# Patient Record
Sex: MALE | Race: WHITE | NOT HISPANIC OR LATINO | Employment: FULL TIME | URBAN - METROPOLITAN AREA
[De-identification: names, ages, dates, MRNs, and addresses within clinical notes are randomized per-mention and may not be internally consistent; named-entity substitution may affect disease eponyms.]

---

## 2017-01-09 ENCOUNTER — THERAPY VISIT (OUTPATIENT)
Dept: SLEEP MEDICINE | Facility: CLINIC | Age: 42
End: 2017-01-09
Attending: INTERNAL MEDICINE
Payer: COMMERCIAL

## 2017-01-09 DIAGNOSIS — G47.33 OBSTRUCTIVE SLEEP APNEA: ICD-10-CM

## 2017-01-09 PROCEDURE — 95811 POLYSOM 6/>YRS CPAP 4/> PARM: CPT | Mod: ZF

## 2017-01-10 PROBLEM — G47.33 SEVERE OBSTRUCTIVE SLEEP APNEA: Status: ACTIVE | Noted: 2017-01-10

## 2017-01-10 NOTE — PROCEDURES
"SLEEP STUDY INTERPRETATION  SPLIT-NIGHT STUDY      Patient: Rikki Mejia  Date of Birth: [birth year]  Study Date: 1/09/17  MRN: [id number]  Referring Provider: self  Ordering Provider: MD Chelle, Jose    Indications for Polysomnography: The patient is a 41 y old Male who is 6' 3\" and weighs 273.0 lbs.  His BMI is 34.3, [place] sleepiness scale is 0.0 and neck size is 43cm.  Relevant medical history includes borderline hypertension.  A diagnostic polysomnogram was performed to evaluate for sleep apnea.  After 132.5 minutes of sleep time the patient exhibited sufficient respiratory events qualifying him for a CPAP trial which was then initiated.      Polysomnogram Data:  A full night polysomnogram recorded the standard physiologic parameters including EEG, EOG, EMG, ECG, nasal and oral airflow.  Respiratory parameters of chest and abdominal movements were recorded with respiratory inductance plethysmography.  Oxygen saturation was recorded by pulse oximetry.      Diagnostic PSG  Sleep Architecture: Severe sleep disruption attributable to respiratory events with absence of stage R.  The total recording time of the diagnostic portion of the study was 147.5 minutes.  The total sleep time was 132.5 minutes.  During the diagnostic portion of the study the sleep latency was decreased at 4.0 minutes without the use of a sleep aid.  REM latency was - minutes.  Arousal index was increased at 60.7 arousals per hour.  Sleep efficiency was normal at 89.8%.  Wake after sleep onset was 9.5 minutes.   The patient spent 6.8% of total sleep time in Stage N1, 87.2% in Stage N2, 6.0% in Stage N3 and 0.0% in REM.       Respiration: Severe obstructive sleep apnea worse supine.     Events - During the diagnostic portion of the study, the polysomnogram revealed a presence of 19 obstructive apneas resulting in an apnea index of 8.6 events per hour.  There were 61 hypopneas resulting in a hypopnea index of 27.6 events per hour.  The combined " apnea/hypopnea index was 36.2 events per hour.  The supine AHI was 67.3 events per hour.  The RERA index was 23.5 events per hour.  The RDI was 59.7 events per hour.     Snoring - was reported as loud    Respiratory rate and pattern - was notable for normal respiratory rate and pattern.    Sustained Sleep Associated Hypoventilation - Transcutaneous carbon dioxide monitoring was not used.    Sleep Associated Hypoxemia - (Greater than 5 minutes O2 sat below 89%) was not present.  Baseline oxygen saturation was 94.4%. Lowest oxygen saturation was 86.5%.  Time spent less than or equal to 88% was 0.2 minutes.  Time spent less than or equal to 89% was 0.4 minutes.  59.7 23.5 36.2     Treatment PSG  Sleep Architecture: Summary assessment  At 02:16:12 AM the patient was placed on CPAP treatment and was titrated at pressures ranging from 6 cmH2O up to 9 cmH2O.  The total recording time of the treatment portion of the study was 243.3 minutes.  The total sleep time was 216.5 minutes.  During the treatment portion of the study the sleep latency was 7.0 minutes.  REM latency was 21.5 minutes.  Arousal index was normal at 15.0 arousals per hour.  Sleep efficiency was normal at 89.0%.  Wake after sleep onset was 15.0 minutes.  The patient spent 5.3% of total sleep time in Stage N1, 35.3% in Stage N2, 23.6% in Stage N3 and 35.8% in REM.       Respiration: Elimination of significant sleep apnea with CPAP.  The optimal pressure was 9 cmH2O with an AHI of 0 events per hour.  Time in REM supine on final pressure was 37.5 minutes.   This titration was considered optimal (residual AHI < 5 events per hour and including REM-supine sleep at final pressure).     Movement Activity: No abnormal sleep movements.     Periodic Limb Movements  o During the diagnostic portion of the study, there were 7 PLMs recorded. The PLM index was 3.2 movements per hour.  The PLM Arousal Index was 0 per hour.  o During the treatment portion of the study, there  were 4 PLMs recorded. The PLM index was 1.1 movements per hour.  The PLM Arousal Index was 0 per hour.    REM EMG Activity - Excessive transient / sustained muscle activity was not present.    Nocturnal Behavior - Abnormal sleep related behaviors were not noted.    Bruxism - None apparent.    Cardiac Summary: Normal sinus rhythm.   During the diagnostic portion of the study, the average pulse rate was 68.8 bpm.  The minimum pulse rate was 54.2 bpm while the maximum pulse rate was 109.8 bpm.    During the treatment portion of the study, the average pulse rate was 64.0 bpm.  The minimum pulse rate was 51.9 bpm while the maximum pulse rate was 101.1 bpm.   Arrhythmias were not noted.      Assessment:     Severe obstructive sleep apnea and sleep disruption worse supine and effectively treated with CPAP.    Recommendations:    Treatment of ZACHARY with Auto-titrating PAP therapy with a range of 7 -  17 cmH2O.  Recommend clinical follow up with sleep management team, including review of compliance measures.    If CPAP unacceptable, consider oral device, or surgery. Long term weight loss and sleep positioning may improve response to alternative therapies.     Advise regarding the risks of drowsy driving.    Suggest optimizing sleep schedule and avoiding sleep deprivation.    Weight management.    Pharmacologic therapy should be used for management of restless legs syndrome only if present and clinically indicated and not based on the presence of periodic limb movements alone.    Diagnostic Codes:    Obstructive Sleep Apnea G47.33    Repetitive Intrusions Into Sleep F51.8                                                                                      _____________________________________   Jose Corbett MD 1/10/17

## 2017-01-10 NOTE — PATIENT INSTRUCTIONS
[facility]    1. Your sleep study will be reviewed by a sleep physician within the next few days.     2. Please follow up in the sleep clinic as scheduled, or, make an appointment with your sleep provider to be seen within two weeks to discuss the results of the sleep study.    3. If you have any questions or problems with your treatment plan, please contact your sleep clinic provider at [phone] to further manage your condition.    4. Please review your attached medication list, and, at your follow-up appointment advise your sleep clinic provider about any changes.    5. Go to [email or web address] for more information about your sleep problems.    Rodo King, RPSGT  January 10, 2017

## 2017-01-10 NOTE — PROGRESS NOTES
Completed a split night PSG per provider order.    Preliminary AHI 35.  A final therapeutic PAP pressure was achieved.    Supine REM was seen on therapeutic pressure.    Patient reports feeling refreshed in AM.

## 2017-02-02 ENCOUNTER — OFFICE VISIT (OUTPATIENT)
Dept: SLEEP MEDICINE | Facility: CLINIC | Age: 42
End: 2017-02-02
Attending: INTERNAL MEDICINE
Payer: COMMERCIAL

## 2017-02-02 VITALS
OXYGEN SATURATION: 96 % | BODY MASS INDEX: 35.41 KG/M2 | HEART RATE: 72 BPM | SYSTOLIC BLOOD PRESSURE: 133 MMHG | RESPIRATION RATE: 16 BRPM | WEIGHT: 275.9 LBS | DIASTOLIC BLOOD PRESSURE: 88 MMHG | HEIGHT: 74 IN

## 2017-02-02 DIAGNOSIS — G47.33 OBSTRUCTIVE SLEEP APNEA: Primary | ICD-10-CM

## 2017-02-02 PROCEDURE — 99211 OFF/OP EST MAY X REQ PHY/QHP: CPT | Mod: ZF

## 2017-02-02 PROCEDURE — 40000809 ZZH STATISTIC NO DOCUMENTATION TO SUPPORT CHARGE

## 2017-02-02 NOTE — NURSING NOTE
"Chief Complaint   Patient presents with     RECHECK     follow up psg       Initial /90 mmHg  Pulse 79  Resp 16  Ht 1.88 m (6' 2\")  Wt 125.147 kg (275 lb 14.4 oz)  BMI 35.41 kg/m2  SpO2 96% Estimated body mass index is 35.41 kg/(m^2) as calculated from the following:    Height as of this encounter: 1.88 m (6' 2\").    Weight as of this encounter: 125.147 kg (275 lb 14.4 oz).  BP completed using cuff size: large  Left arm    Michaelan-Gogo CMA      "

## 2017-02-02 NOTE — PATIENT INSTRUCTIONS

## 2017-02-02 NOTE — PROGRESS NOTES
Balaji Patton is a 41-year-old gentleman with severe obesity and severe obstructive sleep apnea with hypoxemia based on polysomnography performed in 2016 showing an apnea/hypopnea index of 36 with rather severe sleep disruption resolved with the use of CPAP.  He does not have daytime sleepiness would be interested in reducing health risks by treating his sleep apnea.  We have offered him an oral device therapy as primary therapy.  He does have a positional component; however, there is some residual disease in the left decubitus position while sleeping.  Oral device might be more effective in this setting.      PLAN:  Oral device therapy for sleep apnea.  Follow up in 3 months and consideration for repeat home sleep testing with this device once adjusted      Total time spent with patient 25 minutes, greater than 50% counseling regarding management of sleep apnea continuity of care.         RICHELLE JOHNSON MD             D: 2017 16:01   T: 2017 17:08   MT: DIVYA      Name:     BALAJI PATTON   MRN:      [id number]        Account:      [id number]   :      1975           Visit Date:   2017      Document: [id number]

## 2017-02-03 ENCOUNTER — DOCUMENTATION ONLY (OUTPATIENT)
Dept: SLEEP MEDICINE | Facility: CLINIC | Age: 42
End: 2017-02-03

## 2017-02-03 NOTE — PROGRESS NOTES
Dental referral sent on Friday February 3, 2017  to [facility] ([address]) to have Dental  to review and start process of referral then contact pt with scheduling.  Vaishali Garcia,

## 2017-02-22 ENCOUNTER — TELEPHONE (OUTPATIENT)
Dept: DENTISTRY | Facility: CLINIC | Age: 42
End: 2017-02-22

## 2018-01-17 NOTE — TELEPHONE ENCOUNTER
Called to schedule NP appt to see Dr. Cramer for ZACHARY. Left message with my contact phone number and ext.

## 2018-10-12 ENCOUNTER — ALLIED HEALTH/NURSE VISIT (OUTPATIENT)
Dept: NURSING | Facility: CLINIC | Age: 43
End: 2018-10-12
Payer: COMMERCIAL

## 2018-10-12 DIAGNOSIS — Z23 NEED FOR PROPHYLACTIC VACCINATION AND INOCULATION AGAINST INFLUENZA: Primary | ICD-10-CM

## 2018-10-12 PROCEDURE — 90471 IMMUNIZATION ADMIN: CPT

## 2018-10-12 PROCEDURE — 90686 IIV4 VACC NO PRSV 0.5 ML IM: CPT

## 2018-10-12 PROCEDURE — 99207 ZZC NO CHARGE NURSE ONLY: CPT

## 2018-10-12 NOTE — MR AVS SNAPSHOT
After Visit Summary   10/12/2018    Rikki Mejia    MRN: [id number]           Patient Information     Date Of Birth          1975        Visit Information        Provider Department      10/12/2018 9:40 AM CARE COORDINATOR [facility]        Today's Diagnoses     Need for prophylactic vaccination and inoculation against influenza    -  1       Follow-ups after your visit        Who to contact     If you have questions or need follow up information about today's clinic visit or your schedule please contact [facility] directly at [phone].  Normal or non-critical lab and imaging results will be communicated to you by [employer], letter or phone within 4 business days after the clinic has received the results. If you do not hear from us within 7 days, please contact the clinic through [employer] or phone. If you have a critical or abnormal lab result, we will notify you by phone as soon as possible.  Submit refill requests through [employer] or call your pharmacy and they will forward the refill request to us. Please allow 3 business days for your refill to be completed.          Additional Information About Your Visit        MyChart Information     [employer] gives you secure access to your electronic health record. If you see a primary care provider, you can also send messages to your care team and make appointments. If you have questions, please call your primary care clinic.  If you do not have a primary care provider, please call [phone] and they will assist you.        Care EveryWhere ID     This is your Care EveryWhere ID. This could be used by other organizations to access your [facility] medical records  [id number]         Blood Pressure from Last 3 Encounters:   02/02/17 141/90   12/15/16 141/88   11/08/16 120/80    Weight from Last 3 Encounters:   02/02/17 275 lb 14.4 oz (125.1 kg)   12/15/16 273 lb (123.8 kg)   11/08/16 272 lb  (123.4 kg)              We Performed the Following     FLU VACCINE, SPLIT VIRUS, IM (QUADRIVALENT) [66133]- >3 YRS     Vaccine Administration, Initial [51997]        Primary Care Provider    None Specified       No primary provider on file.        Equal Access to Services     JAMIE SILVA : Hadii aad ku hadellanalini Nikiafranky, wajaxonda luqadaha, qaybta kaalmada adenatalieda, carol reece blakeluca lesliekristalcaitlyn marques. So [facility] [phone].    ATENCIÓN: Si habla español, tiene a alejandro disposición servicios gratuitos de asistencia lingüística. Llame al [phone].    We comply with applicable federal civil rights laws and [place] laws. We do not discriminate on the basis of race, color, national origin, age, disability, sex, sexual orientation, or gender identity.            Thank you!     Thank you for choosing [facility]  for your care. Our goal is always to provide you with excellent care. Hearing back from our patients is one way we can continue to improve our services. Please take a few minutes to complete the written survey that you may receive in the mail after your visit with us. Thank you!             Your Updated Medication List - Protect others around you: Learn how to safely use, store and throw away your medicines at www.disposemymeds.org.      Notice  As of 10/12/2018 11:00 AM    You have not been prescribed any medications.

## 2018-10-12 NOTE — PROGRESS NOTES
Injectable Influenza Immunization Documentation    1.  Is the person to be vaccinated sick today?   No    2. Does the person to be vaccinated have an allergy to a component   of the vaccine?   No  Egg Allergy Algorithm Link    3. Has the person to be vaccinated ever had a serious reaction   to influenza vaccine in the past?   No    4. Has the person to be vaccinated ever had Guillain-Barré syndrome?   No    Form completed by Jean Nur CMA ([facility])

## 2020-03-01 ENCOUNTER — HEALTH MAINTENANCE LETTER (OUTPATIENT)
Age: 45
End: 2020-03-01

## 2020-07-17 ENCOUNTER — HOSPITAL ENCOUNTER (EMERGENCY)
Facility: CLINIC | Age: 45
Discharge: HOME | End: 2020-07-17
Attending: INTERNAL MEDICINE | Admitting: INTERNAL MEDICINE
Payer: COMMERCIAL

## 2020-07-17 ENCOUNTER — APPOINTMENT (OUTPATIENT)
Dept: GENERAL RADIOLOGY | Facility: CLINIC | Age: 45
End: 2020-07-17
Attending: EMERGENCY MEDICINE
Payer: COMMERCIAL

## 2020-07-17 ENCOUNTER — APPOINTMENT (OUTPATIENT)
Dept: ULTRASOUND IMAGING | Facility: CLINIC | Age: 45
End: 2020-07-17
Attending: EMERGENCY MEDICINE
Payer: COMMERCIAL

## 2020-07-17 VITALS
BODY MASS INDEX: 33.37 KG/M2 | DIASTOLIC BLOOD PRESSURE: 77 MMHG | WEIGHT: 260 LBS | RESPIRATION RATE: 16 BRPM | HEART RATE: 80 BPM | TEMPERATURE: 98.5 F | OXYGEN SATURATION: 97 % | SYSTOLIC BLOOD PRESSURE: 127 MMHG | HEIGHT: 74 IN

## 2020-07-17 DIAGNOSIS — M79.662 PAIN OF LEFT CALF: ICD-10-CM

## 2020-07-17 PROCEDURE — 99284 EMERGENCY DEPT VISIT MOD MDM: CPT | Mod: Z6 | Performed by: EMERGENCY MEDICINE

## 2020-07-17 PROCEDURE — 76882 US LMTD JT/FCL EVL NVASC XTR: CPT

## 2020-07-17 PROCEDURE — 73560 X-RAY EXAM OF KNEE 1 OR 2: CPT | Mod: LT

## 2020-07-17 PROCEDURE — 99285 EMERGENCY DEPT VISIT HI MDM: CPT | Mod: 25

## 2020-07-17 PROCEDURE — 25000132 ZZH RX MED GY IP 250 OP 250 PS 637: Performed by: EMERGENCY MEDICINE

## 2020-07-17 PROCEDURE — 73590 X-RAY EXAM OF LOWER LEG: CPT | Mod: LT

## 2020-07-17 PROCEDURE — 73630 X-RAY EXAM OF FOOT: CPT | Mod: LT

## 2020-07-17 RX ORDER — HYDROCODONE BITARTRATE AND ACETAMINOPHEN 5; 325 MG/1; MG/1
1 TABLET ORAL ONCE
Status: COMPLETED | OUTPATIENT
Start: 2020-07-17 | End: 2020-07-17

## 2020-07-17 RX ORDER — NAPROXEN 500 MG/1
500 TABLET ORAL ONCE
Status: COMPLETED | OUTPATIENT
Start: 2020-07-17 | End: 2020-07-17

## 2020-07-17 RX ADMIN — NAPROXEN 500 MG: 500 TABLET ORAL at 20:05

## 2020-07-17 RX ADMIN — HYDROCODONE BITARTRATE AND ACETAMINOPHEN 1 TABLET: 5; 325 TABLET ORAL at 20:05

## 2020-07-17 ASSESSMENT — MIFFLIN-ST. JEOR: SCORE: 2134.1
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
## 2020-07-18 NOTE — ED TRIAGE NOTES
Patient presents A&Ox4 to triage c/o left lower leg pain. Patient states he was playing around with his kids today when he got a sharp pain in his left lower leg. Patient also states he is not able to move his left foot without pain that radiates to the back of his left knee.

## 2020-07-18 NOTE — DISCHARGE INSTRUCTIONS
Please make an appointment to follow up with Your Primary Care Provider and Orthopedics Clinic (phone: [phone]) in 7 days as needed.

## 2022-03-22 ENCOUNTER — HOSPITAL ENCOUNTER (EMERGENCY)
Facility: CLINIC | Age: 47
Discharge: HOME | End: 2022-03-22
Attending: EMERGENCY MEDICINE | Admitting: EMERGENCY MEDICINE
Payer: COMMERCIAL

## 2022-03-22 VITALS
HEIGHT: 74 IN | BODY MASS INDEX: 33.37 KG/M2 | RESPIRATION RATE: 16 BRPM | TEMPERATURE: 97.6 F | OXYGEN SATURATION: 98 % | DIASTOLIC BLOOD PRESSURE: 92 MMHG | SYSTOLIC BLOOD PRESSURE: 133 MMHG | WEIGHT: 260 LBS | HEART RATE: 84 BPM

## 2022-03-22 DIAGNOSIS — M54.50 LUMBAR BACK PAIN: ICD-10-CM

## 2022-03-22 PROCEDURE — 250N000013 HC RX MED GY IP 250 OP 250 PS 637: Performed by: EMERGENCY MEDICINE

## 2022-03-22 PROCEDURE — 99284 EMERGENCY DEPT VISIT MOD MDM: CPT | Performed by: EMERGENCY MEDICINE

## 2022-03-22 PROCEDURE — 99284 EMERGENCY DEPT VISIT MOD MDM: CPT

## 2022-03-22 RX ORDER — CYCLOBENZAPRINE HCL 10 MG
5 TABLET ORAL 3 TIMES DAILY PRN
Qty: 9 TABLET | Refills: 0 | Status: SHIPPED | OUTPATIENT
Start: 2022-03-22 | End: 2022-03-28

## 2022-03-22 RX ORDER — HYDROCODONE BITARTRATE AND ACETAMINOPHEN 5; 325 MG/1; MG/1
1 TABLET ORAL EVERY 6 HOURS PRN
Qty: 10 TABLET | Refills: 0 | Status: SHIPPED | OUTPATIENT
Start: 2022-03-22 | End: 2022-03-25

## 2022-03-22 RX ORDER — DIAZEPAM 5 MG
5 TABLET ORAL ONCE
Status: COMPLETED | OUTPATIENT
Start: 2022-03-22 | End: 2022-03-22

## 2022-03-22 RX ORDER — HYDROCODONE BITARTRATE AND ACETAMINOPHEN 5; 325 MG/1; MG/1
1 TABLET ORAL ONCE
Status: COMPLETED | OUTPATIENT
Start: 2022-03-22 | End: 2022-03-22

## 2022-03-22 RX ORDER — IBUPROFEN 200 MG
400 TABLET ORAL EVERY 4 HOURS PRN
COMMUNITY
End: 2022-04-04

## 2022-03-22 RX ADMIN — DIAZEPAM 5 MG: 5 TABLET ORAL at 12:36

## 2022-03-22 RX ADMIN — HYDROCODONE BITARTRATE AND ACETAMINOPHEN 1 TABLET: 5; 325 TABLET ORAL at 12:36

## 2022-03-22 ASSESSMENT — ENCOUNTER SYMPTOMS
FEVER: 0
DYSURIA: 0
DIARRHEA: 0
NUMBNESS: 0
HEMATURIA: 0
CONSTIPATION: 0
COUGH: 0
WEAKNESS: 0
SHORTNESS OF BREATH: 0
ABDOMINAL PAIN: 0
BACK PAIN: 1

## 2022-03-22 NOTE — ED PROVIDER NOTES
[place] EMERGENCY DEPARTMENT ([facility])  3/22/22  History     Chief Complaint   Patient presents with     Back Pain     The history is provided by the patient and medical records.     Rikki Mejia is a 46 year old male with no significant past medical history who presents to the ED for evaluation of back pain.  Patient reports that this pain first started about a week ago.  He reports this pain started off fairly manageable.  He reports worsening of the back pain over the last couple of days.  He reports he has tried ibuprofen and Aleve at home with no improvement of the pain.  He reports he attempted to go to work this morning but could not get out of his car due to pain.  He denies any recent falls, car accidents, or other mechanical injury that could have precipitated his back pain.  He reports he did walk a couple of miles and boots late last week which did make his back feels sore and has been lifting his young children.  He reports he has not had back pain this severe before.  Patient states that the pain does not radiate.  He denies any numbness or weakness into the legs.  He denies any loss of bowel or bladder control, no saddle anesthesia.  Patient reports significant worsening of the pain when he attempts to move.  Patient otherwise denies any abdominal pain.  No testicular pain or swelling.  No dysuria or hematuria.  He reports his bowel movements have been normal.  Patient denies any fevers.  No apparent skin changes or rash.  He denies any recent chiropractic manipulation or injections to the back.  He denies any cough.  No chest pain or shortness of breath.  No swelling in the legs.  Patient reports he is otherwise generally healthy.  He reports he last took Aleve at 7 AM.    I have reviewed the Medications, Allergies, Past Medical and Surgical History, and Social History in the [employer] system.  PAST MEDICAL HISTORY:   Past Medical History:   Diagnosis Date     NO ACTIVE PROBLEMS   "      PAST SURGICAL HISTORY:   Past Surgical History:   Procedure Laterality Date     NO HISTORY OF SURGERY         Past medical history, past surgical history, medications, and allergies were reviewed with the patient. Additional pertinent items: None    FAMILY HISTORY:   Family History   Problem Relation Age of Onset     Coronary Artery Disease Maternal Grandfather        SOCIAL HISTORY:   Social History     Tobacco Use     Smoking status: Former Smoker     Quit date: 2013     Years since quittin.3     Smokeless tobacco: Never Used   Substance Use Topics     Alcohol use: Yes     Alcohol/week: 0.0 standard drinks     Comment: 3-4 times a week     Social history was reviewed with the patient. Additional pertinent items: None      Patient's Medications   New Prescriptions    No medications on file   Previous Medications    IBUPROFEN (ADVIL/MOTRIN) 200 MG TABLET    Take 400 mg by mouth every 4 hours as needed for mild pain   Modified Medications    No medications on file   Discontinued Medications    No medications on file        No Known Allergies     Review of Systems   Constitutional: Negative for fever.   Respiratory: Negative for cough and shortness of breath.    Cardiovascular: Negative for chest pain and leg swelling.   Gastrointestinal: Negative for abdominal pain, constipation and diarrhea.   Genitourinary: Negative for dysuria, hematuria and testicular pain.   Musculoskeletal: Positive for back pain.   Neurological: Negative for weakness and numbness.   All other systems reviewed and are negative.    Physical Exam   BP: (!) 133/92  Pulse: 84  Temp: 97.6  F (36.4  C)  Resp: 16  Height: 188 cm (6' 2\")  Weight: 117.9 kg (260 lb)  SpO2: 98 %      Physical Exam  Vitals and nursing note reviewed.       General: patient is alert and oriented, episodic spasms of pain  Head: atraumatic and normocephalic   EENT: moist mucus membranes, sclera anicteric   Neck: supple, no meningismus  Cardiovascular: regular " rate and rhythm, extremities warm and well perfused, no lower extremity edema, 2+ PT pulses bilaterally   Pulmonary: lungs clear to auscultation bilaterally   Abdomen: soft, non-tender, no pulsatile masses  Musculoskeletal: normal range of motion of the extremities, no midline spinal TTP, negative straight leg raise  Neurological: alert and oriented, moving all extremities symmetrically, strength 5/5 and symmetric in hip flexion/extension, knee flexion/extension and ankle plantar/dorsiflexion, sensation to light touch in lower extremities intact, normal gait  Skin: warm, dry, no skin rashes noted    ED Course   12:18 PM  The patient was seen and examined by Belén Vergara MD in Room ED09.     Procedures           No results found for this or any previous visit (from the past 24 hour(s)).  Medications - No data to display         Assessments & Plan (with Medical Decision Making)   Mr. Mejia is a 46 year old male with no significant past medical history who presents to the ED for evaluation of back pain.  He is hemodynamically stable and afebrile.  He does not have any red flag findings for acute cord compression, cauda equina, epidural abscess, epidural hematoma, discitis or meningitis.  He has not had any recent falls or traumatic injury to warrant further imaging at this time.  Denies signs or symptoms suggestive of UTI, pyelonephritis, diverticulitis, intra-abdominal infection, AAA.  History and exam are most consistent with musculoskeletal pain and spasm.  He was given oral Valium and Norco in the ED and on reevaluation his pain has resolved.  He is able to ambulate independently.  We will plan to discharge to home with symptomatic management and instructions to follow-up with primary care in 1 week if he has any ongoing symptoms for further evaluation including additional imaging versus PT referral.  He was given close return precautions for the emergency department and voiced understanding.    I have reviewed  the nursing notes.    I have reviewed the findings, diagnosis, plan and need for follow up with the patient.    New Prescriptions    CYCLOBENZAPRINE (FLEXERIL) 10 MG TABLET    Take 0.5 tablets (5 mg) by mouth 3 times daily as needed for muscle spasms    HYDROCODONE-ACETAMINOPHEN (NORCO) 5-325 MG TABLET    Take 1 tablet by mouth every 6 hours as needed for severe pain       Final diagnoses:   Lumbar back pain   I, Jerson Baltazar, am serving as a trained medical scribe to document services personally performed by Belén Vergara MD, based on the provider's statements to me.      I, Belén Vergara MD, was physically present and have reviewed and verified the accuracy of this note documented by Jerson Baltazar.     Belén Vergara MD  3/22/2022   [facility] EMERGENCY DEPARTMENT     Belén Vergara MD  03/22/22 [id number]

## 2022-03-22 NOTE — DISCHARGE INSTRUCTIONS
Please make an appointment to follow up with Your Primary Care Provider in 7 days if not improving.    Take aleve daily for the next 5 days.  You may use norco sparingly for break through pain.  Use flexeril as needed for muscle spasm.  Do not drive or do other activities that would be dangerous if drowsy.      If you have worsening symptoms including increased pain, numbness/weakness in the legs, loss of bowel or bladder control, fevers or other concerns, return to the emergency department for re-evaluation.

## 2022-04-04 ENCOUNTER — OFFICE VISIT (OUTPATIENT)
Dept: FAMILY MEDICINE | Facility: CLINIC | Age: 47
End: 2022-04-04
Payer: COMMERCIAL

## 2022-04-04 VITALS
SYSTOLIC BLOOD PRESSURE: 132 MMHG | HEART RATE: 74 BPM | BODY MASS INDEX: 34.15 KG/M2 | OXYGEN SATURATION: 98 % | TEMPERATURE: 96.9 F | DIASTOLIC BLOOD PRESSURE: 86 MMHG | WEIGHT: 266 LBS

## 2022-04-04 DIAGNOSIS — M54.50 ACUTE BILATERAL LOW BACK PAIN WITHOUT SCIATICA: Primary | ICD-10-CM

## 2022-04-04 PROCEDURE — 99203 OFFICE O/P NEW LOW 30 MIN: CPT | Performed by: PHYSICIAN ASSISTANT

## 2022-04-04 RX ORDER — CYCLOBENZAPRINE HCL 10 MG
10 TABLET ORAL 3 TIMES DAILY PRN
Qty: 30 TABLET | Refills: 0 | Status: SHIPPED | OUTPATIENT
Start: 2022-04-04

## 2022-04-04 ASSESSMENT — PAIN SCALES - GENERAL: PAINLEVEL: NO PAIN (0)

## 2022-04-04 NOTE — NURSING NOTE
"Chief Complaint   Patient presents with     Hospital F/U       Initial BP (!) 140/91   Pulse 74   Temp 96.9  F (36.1  C) (Tympanic)   Wt 120.7 kg (266 lb)   SpO2 98%   BMI 34.15 kg/m   Estimated body mass index is 34.15 kg/m  as calculated from the following:    Height as of 3/22/22: 1.88 m (6' 2\").    Weight as of this encounter: 120.7 kg (266 lb).  Medication Reconciliation: complete    KASEY Warren MA    "

## 2022-04-04 NOTE — PROGRESS NOTES
"  Assessment & Plan     Acute bilateral low back pain without sciatica  Muscular strain.   Discussed strengthening / regular exercise to help with prevention of these episodes in the future.   Okay to use NSAIDS. Refill of the flexeril was also given to use as needed.   He is aware of side effects and does not tolerate during the day.   Consider physical therapy. He was given contact information.   - cyclobenzaprine (FLEXERIL) 10 MG tablet; Take 1 tablet (10 mg) by mouth 3 times daily as needed for muscle spasms  - Physical Therapy Referral; Future    Review of prior external note(s) from - ER visit at [facility]         BMI:   Estimated body mass index is 34.15 kg/m  as calculated from the following:    Height as of 3/22/22: 1.88 m (6' 2\").    Weight as of this encounter: 120.7 kg (266 lb).   Weight management plan: Discussed healthy diet and exercise guidelines        Return in about 1 year (around 4/4/2023) for with primary provider.    Kristen M. Kehr, PA-C  [facility]   Rikki is a 46 year old who presents for the following health issues     Rikki was seen in the ER for muscular low back pain.   He took a few of the muscle relaxants and they were helpful. He continues to use the ibuprofen / aleve as needed.   The pain has resolved for the most part.   He will have a twinge of pain occasionally.     There were never any red flag symptoms. No imaging was indicated.     HPI     ED/UC Followup:    Facility:  [facility]  Date of visit: 03/22/22  Reason for visit: lower back pain  Current Status: improved           Review of Systems   Constitutional, HEENT, cardiovascular, pulmonary, GI, , musculoskeletal, neuro, skin, endocrine and psych systems are negative, except as otherwise noted.      Objective    /86   Pulse 74   Temp 96.9  F (36.1  C) (Tympanic)   Wt 120.7 kg (266 lb)   SpO2 98%   BMI 34.15 kg/m    Body mass index is 34.15 kg/m .  Physical Exam   GENERAL: healthy, " alert and no distress  PSYCH: mentation appears normal, affect normal/bright

## 2022-06-22 NOTE — PATIENT INSTRUCTIONS
Please use antibiotics as discussed  Get labs  Colonoscopy    Please follow up in 10-14 for rash recheck and physical   Nice to meet you       Cesar Killian D.O.      Patient Education

## 2022-06-22 NOTE — PROGRESS NOTES
ICD-10-CM    1. Tick bite, unspecified site, initial encounter  W57.XXXA doxycycline hyclate (VIBRAMYCIN) 100 MG capsule   2. Screen for colon cancer  Z12.11 GASTROENTEROLOGY ADULT REF PROCEDURE ONLY   3. Screening for hyperlipidemia  Z13.220 Lipid panel reflex to direct LDL Fasting     Lipid panel reflex to direct LDL Fasting   4. Redness of skin  L53.9 CBC with platelets and differential     **Lyme Disease Total Abs Bld with Reflex to Confirm CLIA FUTURE 14d     doxycycline hyclate (VIBRAMYCIN) 100 MG capsule     CBC with platelets and differential     **Lyme Disease Total Abs Bld with Reflex to Confirm CLIA FUTURE 14d   5. Lipid screening  Z13.220    6. Screening for diabetes mellitus  Z13.1 Basic metabolic panel  (Ca, Cl, CO2, Creat, Gluc, K, Na, BUN)     Basic metabolic panel  (Ca, Cl, CO2, Creat, Gluc, K, Na, BUN)     New patient to this provider    Tick bite few days ago, removed, now redness of skin-antibiotics advised, labs done today, no other sx  Follow up in a week to make sure it is resolved    He is due for preventive visit, screening labs done  Advised establishing care  colonoscopy ordered      Subjective   Jose is a 46 year old accompanied by his self., presenting for the following health issues:  Tick Bite    Not sure how long tick was present.   Located on shoulder blade.     History of Present Illness       Reason for visit:  Have a tick bite looked at    He eats 2-3 servings of fruits and vegetables daily.He consumes 1 sweetened beverage(s) daily.He exercises with enough effort to increase his heart rate 20 to 29 minutes per day.  He exercises with enough effort to increase his heart rate 3 or less days per week.   He is taking medications regularly.       Review of Systems   Constitutional, HEENT, cardiovascular, pulmonary, GI, , musculoskeletal, neuro, skin, endocrine and psych systems are negative, except as otherwise noted.      Objective    /76   Pulse 82   Temp 98  F (36.7  " C) (Oral)   Resp 16   Ht 1.88 m (6' 2\")   Wt 121.1 kg (267 lb)   SpO2 98%   BMI 34.28 kg/m    Body mass index is 34.28 kg/m .  Physical Exam   GENERAL: healthy, alert and no distress  NECK: no adenopathy, no asymmetry, masses, or scars and thyroid normal to palpation  RESP: lungs clear to auscultation - no rales, rhonchi or wheezes  CV: regular rate and rhythm, normal S1 S2, no S3 or S4, no murmur, click or rub, no peripheral edema and peripheral pulses strong  ABDOMEN: soft, nontender, no hepatosplenomegaly, no masses and bowel sounds normal  MS: no gross musculoskeletal defects noted, no edema  Skin-circular redness on back      Results for orders placed or performed in visit on 06/23/22   CBC with platelets and differential     Status: None   Result Value Ref Range    WBC Count 6.0 4.0 - 11.0 10e3/uL    RBC Count 4.94 4.40 - 5.90 10e6/uL    Hemoglobin 14.8 13.3 - 17.7 g/dL    Hematocrit 42.7 40.0 - 53.0 %    MCV 86 78 - 100 fL    MCH 30.0 26.5 - 33.0 pg    MCHC 34.7 31.5 - 36.5 g/dL    RDW 12.4 10.0 - 15.0 %    Platelet Count 236 150 - 450 10e3/uL    % Neutrophils 54 %    % Lymphocytes 33 %    % Monocytes 9 %    % Eosinophils 4 %    % Basophils 1 %    Absolute Neutrophils 3.2 1.6 - 8.3 10e3/uL    Absolute Lymphocytes 2.0 0.8 - 5.3 10e3/uL    Absolute Monocytes 0.5 0.0 - 1.3 10e3/uL    Absolute Eosinophils 0.3 0.0 - 0.7 10e3/uL    Absolute Basophils 0.0 0.0 - 0.2 10e3/uL   CBC with platelets and differential     Status: None    Narrative    The following orders were created for panel order CBC with platelets and differential.  Procedure                               Abnormality         Status                     ---------                               -----------         ------                     CBC with platelets and d...[[id number]]                      Final result                 Please view results for these tests on the individual orders.                   .  ..  "

## 2022-06-23 ENCOUNTER — OFFICE VISIT (OUTPATIENT)
Dept: FAMILY MEDICINE | Facility: CLINIC | Age: 47
End: 2022-06-23
Payer: COMMERCIAL

## 2022-06-23 VITALS
WEIGHT: 267 LBS | HEIGHT: 74 IN | BODY MASS INDEX: 34.27 KG/M2 | DIASTOLIC BLOOD PRESSURE: 76 MMHG | OXYGEN SATURATION: 98 % | RESPIRATION RATE: 16 BRPM | SYSTOLIC BLOOD PRESSURE: 128 MMHG | TEMPERATURE: 98 F | HEART RATE: 82 BPM

## 2022-06-23 DIAGNOSIS — L53.9 REDNESS OF SKIN: ICD-10-CM

## 2022-06-23 DIAGNOSIS — Z13.220 LIPID SCREENING: ICD-10-CM
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
## 2022-06-23 DIAGNOSIS — Z13.220 SCREENING FOR HYPERLIPIDEMIA: ICD-10-CM

## 2022-06-23 DIAGNOSIS — Z12.11 SCREEN FOR COLON CANCER: ICD-10-CM

## 2022-06-23 DIAGNOSIS — Z13.1 SCREENING FOR DIABETES MELLITUS: ICD-10-CM

## 2022-06-23 LAB
ANION GAP SERPL CALCULATED.3IONS-SCNC: 6 MMOL/L (ref 3–14)
B BURGDOR IGG+IGM SER QL: 0.16
BASOPHILS # BLD AUTO: 0 10E3/UL (ref 0–0.2)
BASOPHILS NFR BLD AUTO: 1 %
BUN SERPL-MCNC: 12 MG/DL (ref 7–30)
CALCIUM SERPL-MCNC: 8.8 MG/DL (ref 8.5–10.1)
CHLORIDE BLD-SCNC: 110 MMOL/L (ref 94–109)
CHOLEST SERPL-MCNC: 197 MG/DL
CO2 SERPL-SCNC: 25 MMOL/L (ref 20–32)
CREAT SERPL-MCNC: 0.89 MG/DL (ref 0.66–1.25)
EOSINOPHIL # BLD AUTO: 0.3 10E3/UL (ref 0–0.7)
EOSINOPHIL NFR BLD AUTO: 4 %
ERYTHROCYTE [DISTWIDTH] IN BLOOD BY AUTOMATED COUNT: 12.4 % (ref 10–15)
FASTING STATUS PATIENT QL REPORTED: YES
GFR SERPL CREATININE-BSD FRML MDRD: >90 ML/MIN/1.73M2
GLUCOSE BLD-MCNC: 113 MG/DL (ref 70–99)
HCT VFR BLD AUTO: 42.7 % (ref 40–53)
HDLC SERPL-MCNC: 42 MG/DL
HGB BLD-MCNC: 14.8 G/DL (ref 13.3–17.7)
LDLC SERPL CALC-MCNC: 92 MG/DL
LYMPHOCYTES # BLD AUTO: 2 10E3/UL (ref 0.8–5.3)
LYMPHOCYTES NFR BLD AUTO: 33 %
MCH RBC QN AUTO: 30 PG (ref 26.5–33)
MCHC RBC AUTO-ENTMCNC: 34.7 G/DL (ref 31.5–36.5)
MCV RBC AUTO: 86 FL (ref 78–100)
MONOCYTES # BLD AUTO: 0.5 10E3/UL (ref 0–1.3)
MONOCYTES NFR BLD AUTO: 9 %
NEUTROPHILS # BLD AUTO: 3.2 10E3/UL (ref 1.6–8.3)
NEUTROPHILS NFR BLD AUTO: 54 %
NONHDLC SERPL-MCNC: 155 MG/DL
PLATELET # BLD AUTO: 236 10E3/UL (ref 150–450)
POTASSIUM BLD-SCNC: 3.7 MMOL/L (ref 3.4–5.3)
RBC # BLD AUTO: 4.94 10E6/UL (ref 4.4–5.9)
SODIUM SERPL-SCNC: 141 MMOL/L (ref 133–144)
TRIGL SERPL-MCNC: 315 MG/DL
WBC # BLD AUTO: 6 10E3/UL (ref 4–11)

## 2022-06-23 PROCEDURE — 99213 OFFICE O/P EST LOW 20 MIN: CPT | Performed by: FAMILY MEDICINE

## 2022-06-23 PROCEDURE — 80048 BASIC METABOLIC PNL TOTAL CA: CPT | Performed by: FAMILY MEDICINE

## 2022-06-23 PROCEDURE — 36415 COLL VENOUS BLD VENIPUNCTURE: CPT | Performed by: FAMILY MEDICINE

## 2022-06-23 PROCEDURE — 80061 LIPID PANEL: CPT | Performed by: FAMILY MEDICINE

## 2022-06-23 PROCEDURE — 85025 COMPLETE CBC W/AUTO DIFF WBC: CPT | Performed by: FAMILY MEDICINE

## 2022-06-23 PROCEDURE — 86618 LYME DISEASE ANTIBODY: CPT | Performed by: FAMILY MEDICINE

## 2022-06-23 RX ORDER — DOXYCYCLINE 100 MG/1
100 CAPSULE ORAL 2 TIMES DAILY
Qty: 20 CAPSULE | Refills: 0 | Status: SHIPPED | OUTPATIENT
Start: 2022-06-23 | End: 2022-07-03

## 2022-06-23 ASSESSMENT — PAIN SCALES - GENERAL: PAINLEVEL: NO PAIN (0)

## 2022-06-28 ENCOUNTER — HOSPITAL ENCOUNTER (OUTPATIENT)
Facility: AMBULATORY SURGERY CENTER | Age: 47
End: 2022-06-28
Attending: INTERNAL MEDICINE
Payer: COMMERCIAL

## 2022-06-28 ENCOUNTER — TELEPHONE (OUTPATIENT)
Dept: GASTROENTEROLOGY | Facility: CLINIC | Age: 47
End: 2022-06-28

## 2022-06-28 NOTE — TELEPHONE ENCOUNTER
Screening Questions    BlueKIND OF PREP RedLOCATION [review exclusion criteria] GreenSEDATION TYPE    Have you had a positive covid test in the last 90 days? n  If yes, what date?     Do you have a legal guardian or medical Power of ?  Are you able to give consent for your medical care?y (Sedation review/consideration needed)    1. Are you active on mychart? Y    2. What insurance is in the chart? MEDICA     3.   Ordering/Referring Provider: Cesar Killian DO     4. BMI 33.4 [BMI OVER 40-EXTENDED PREP]  If greater than 40 review exclusion criteria [PAC APPT IF (MAC) @ [facility]]      5.  Respiratory Screening:  [If yes to any of the following HOSPITAL setting only]     Do you use daily home oxygen? N    Do you have mod to severe Obstructive Sleep Apnea? N  PER PT MILD [OKAY @ [facility] SH PH RI]   Do you have Pulmonary Hypertension? N     Do you have UNCONTROLLED asthma? N        6.   Have you had a heart or lung transplant? N      7.   Are you currently on dialysis? N [ If yes, G-PREP & HOSPITAL setting only]     8.   Do you have chronic kidney disease? N [ If yes, G-PREP ]    9.   Have you had a stroke or Transient ischemic attack (TIA - aka  mini stroke ) within 6 months?  N (If yes, please review exclusion criteria)         In the past 6 months, have you had any heart related issues including cardiomyopathy or heart attack? N           If yes, did it require cardiac stenting or other implantable device? N      10   Do you have any implantable devices in your body (pacemaker, defib, LVAD)? N (If yes, please review exclusion criteria)    11.   Do you take nitroglycerin? N            If yes, how often? N  (if yes, HOSPITAL setting ONLY)    12.   Are you currently taking any blood thinners? N           [IF YES, INFORM PATIENT TO FOLLOW UP W/ ORDERING PROVIDER FOR BRIDGING INSTRUCTIONS]     13.   Do you have a diagnosis of diabetes? N   [ If yes, G-PREP ]    14.   [FEMALES] Are you currently  pregnant?     If yes, how many weeks?     15.   Are you taking any prescription pain medications on a routine schedule?  N  [ If yes, EXTENDED PREP.] [If yes, MAC]    16.   Do you have any chemical dependencies such as alcohol, street drugs, or methadone?  N [If yes, MAC]    17.   Do you have any history of post-traumatic stress syndrome, severe anxiety or history of psychosis?  N  [If yes, MAC]    18.   Do you transfer independently? (If NO, please HOSPITAL setting  only)  Y    19.  On a regular basis do you go 3-5 days between bowel movements? N   [ If yes, EXTENDED PREP.]    20.   Preferred LOCAL Pharmacy for Pre Prescription:         [id number] [place], [place] - [address]    Scheduling Details      Caller: Rikki Mejia  (Please ask for phone number if not scheduled by patient)    Type of Procedure Scheduled: Lower Endoscopy [Colonoscopy]    Which Colonoscopy Prep was Sent?: MPREP  DAX CF PATIENTS & GROEN'S PATIENTS NEEDS EXTENDED PREP    Surgeon: LEVENTHAL  Date of Procedure: 8/17  Location: [facility]    Sedation Type: CS    Conscious Sedation- Needs  for 6 hours after the procedure  MAC/General-Needs  for 24 hours after procedure    Pre-op Required at [facility] [place], [place] and OR for MAC sedation: N  (advise patient they will need a pre-op WITH IN 30 DAYS prior to procedure -)      Informed patient they will need an adult  Y  Cannot take any type of public or medical transportation alone    Pre-Procedure Covid test to be completed at [facility] or Externally: HOME    Confirmed Nurse will call to complete assessment Y    Additional comments:

## 2022-07-07 ENCOUNTER — TELEPHONE (OUTPATIENT)
Dept: GASTROENTEROLOGY | Facility: CLINIC | Age: 47
End: 2022-07-07

## 2022-07-07 NOTE — TELEPHONE ENCOUNTER
Caller: INGRID     Procedure: COLON    Date, Location, and Surgeon of Procedure Cancelled: 8/17, ESTEFANY WAGNER, COLON    Ordering Provider:Cesar Killian, DO    Reason for cancel (please be detailed, any staff messages or encounters to note?): PROVIDER NOT AVAILABLE NEEDS RESCHEDULE

## 2022-07-11 ENCOUNTER — OFFICE VISIT (OUTPATIENT)
Dept: FAMILY MEDICINE | Facility: CLINIC | Age: 47
End: 2022-07-11
Payer: COMMERCIAL

## 2022-07-11 VITALS
HEIGHT: 74 IN | OXYGEN SATURATION: 100 % | BODY MASS INDEX: 34.52 KG/M2 | SYSTOLIC BLOOD PRESSURE: 124 MMHG | HEART RATE: 72 BPM | DIASTOLIC BLOOD PRESSURE: 74 MMHG | TEMPERATURE: 98 F | WEIGHT: 269 LBS | RESPIRATION RATE: 16 BRPM

## 2022-07-11 DIAGNOSIS — G47.33 SEVERE OBSTRUCTIVE SLEEP APNEA: ICD-10-CM

## 2022-07-11 DIAGNOSIS — R73.9 ELEVATED BLOOD SUGAR: ICD-10-CM

## 2022-07-11 DIAGNOSIS — Z00.00 ROUTINE GENERAL MEDICAL EXAMINATION AT A HEALTH CARE FACILITY: Primary | ICD-10-CM

## 2022-07-11 DIAGNOSIS — Z13.29 SCREENING FOR THYROID DISORDER: ICD-10-CM

## 2022-07-11 DIAGNOSIS — Z11.4 SCREENING FOR HIV (HUMAN IMMUNODEFICIENCY VIRUS): ICD-10-CM

## 2022-07-11 DIAGNOSIS — Z11.59 NEED FOR HEPATITIS C SCREENING TEST: ICD-10-CM

## 2022-07-11 DIAGNOSIS — E78.2 ELEVATED TRIGLYCERIDES WITH HIGH CHOLESTEROL: ICD-10-CM

## 2022-07-11 DIAGNOSIS — Z12.11 SCREEN FOR COLON CANCER: ICD-10-CM

## 2022-07-11 DIAGNOSIS — Z01.84 IMMUNITY STATUS TESTING: ICD-10-CM

## 2022-07-11 LAB — HBA1C MFR BLD: 5.6 % (ref 0–5.6)

## 2022-07-11 PROCEDURE — 86708 HEPATITIS A ANTIBODY: CPT | Performed by: FAMILY MEDICINE

## 2022-07-11 PROCEDURE — 36415 COLL VENOUS BLD VENIPUNCTURE: CPT | Performed by: FAMILY MEDICINE

## 2022-07-11 PROCEDURE — 80076 HEPATIC FUNCTION PANEL: CPT | Performed by: FAMILY MEDICINE

## 2022-07-11 PROCEDURE — 99396 PREV VISIT EST AGE 40-64: CPT | Performed by: FAMILY MEDICINE

## 2022-07-11 PROCEDURE — 86803 HEPATITIS C AB TEST: CPT | Performed by: FAMILY MEDICINE

## 2022-07-11 PROCEDURE — 87340 HEPATITIS B SURFACE AG IA: CPT | Performed by: FAMILY MEDICINE

## 2022-07-11 PROCEDURE — 87389 HIV-1 AG W/HIV-1&-2 AB AG IA: CPT | Performed by: FAMILY MEDICINE

## 2022-07-11 PROCEDURE — 83036 HEMOGLOBIN GLYCOSYLATED A1C: CPT | Performed by: FAMILY MEDICINE

## 2022-07-11 PROCEDURE — 86706 HEP B SURFACE ANTIBODY: CPT | Performed by: FAMILY MEDICINE

## 2022-07-11 PROCEDURE — 84443 ASSAY THYROID STIM HORMONE: CPT | Performed by: FAMILY MEDICINE

## 2022-07-11 PROCEDURE — 99213 OFFICE O/P EST LOW 20 MIN: CPT | Mod: 25 | Performed by: FAMILY MEDICINE

## 2022-07-11 ASSESSMENT — ENCOUNTER SYMPTOMS
DIARRHEA: 0
NAUSEA: 0
SORE THROAT: 0
HEMATOCHEZIA: 0
HEARTBURN: 0
ABDOMINAL PAIN: 0
FEVER: 0
COUGH: 0
MYALGIAS: 0
DIZZINESS: 0
EYE PAIN: 0
DYSURIA: 0
SHORTNESS OF BREATH: 0
CHILLS: 0
WEAKNESS: 0
PALPITATIONS: 0
HEADACHES: 0
ARTHRALGIAS: 0
FREQUENCY: 0
PARESTHESIAS: 0
HEMATURIA: 0
JOINT SWELLING: 0
NERVOUS/ANXIOUS: 0
CONSTIPATION: 0

## 2022-07-11 ASSESSMENT — PAIN SCALES - GENERAL: PAINLEVEL: NO PAIN (0)

## 2022-07-11 NOTE — PROGRESS NOTES
SUBJECTIVE:   CC: Rikki Mejia is an 46 year old male who presents for preventative health visit.       Patient has been advised of split billing requirements and indicates understanding: Yes  Healthy Habits:     Getting at least 3 servings of Calcium per day:  Yes    Bi-annual eye exam:  NO    Dental care twice a year:  Yes    Sleep apnea or symptoms of sleep apnea:  Sleep apnea    Diet:  Regular (no restrictions)    Frequency of exercise:  2-3 days/week    Duration of exercise:  15-30 minutes    Taking medications regularly:  Yes    Medication side effects:  None    PHQ-2 Total Score: 0    Additional concerns today:  No      Recheck rash/tick bite that he was seen for on 22. Been fine since last visit per patient.     The 10-year ASCVD risk score (Lala SCHNEIDER Jr., et al., 2013) is: 2.9%    Values used to calculate the score:      Age: 46 years      Sex: Male      Is Non- : No      Diabetic: No      Tobacco smoker: No      Systolic Blood Pressure: 124 mmHg      Is BP treated: No      HDL Cholesterol: 42 mg/dL      Total Cholesterol: 197 mg/dL      Today's PHQ-2 Score:   PHQ-2 (  [employer]) 2022   Q1: Little interest or pleasure in doing things 0   Q2: Feeling down, depressed or hopeless 0   PHQ-2 Score 0   Q1: Little interest or pleasure in doing things Not at all   Q2: Feeling down, depressed or hopeless Not at all   PHQ-2 Score 0       Abuse: Current or Past(Physical, Sexual or Emotional)- No  Do you feel safe in your environment? Yes    Have you ever done Advance Care Planning? (For example, a Health Directive, POLST, or a discussion with a medical provider or your loved ones about your wishes): No, advance care planning information given to patient to review.  Patient plans to discuss their wishes with loved ones or provider.      Social History     Tobacco Use     Smoking status: Former Smoker     Quit date: 2013     Years since quittin.6     Smokeless tobacco:  Never Used   Substance Use Topics     Alcohol use: Yes     Alcohol/week: 0.0 standard drinks     Comment: 3-4 times a week         Alcohol Use 7/11/2022   Prescreen: >3 drinks/day or >7 drinks/week? No   Prescreen: >3 drinks/day or >7 drinks/week? -       Last PSA: No results found for: PSA    Reviewed orders with patient. Reviewed health maintenance and updated orders accordingly - Yes  BP Readings from Last 3 Encounters:   07/11/22 124/74   06/23/22 128/76   04/04/22 132/86    Wt Readings from Last 3 Encounters:   07/11/22 122 kg (269 lb)   06/23/22 121.1 kg (267 lb)   04/04/22 120.7 kg (266 lb)                    Reviewed and updated as needed this visit by clinical staff   Tobacco   Meds   Med Hx  Surg Hx  Fam Hx  Soc Hx          Reviewed and updated as needed this visit by Provider                   Past Medical History:   Diagnosis Date     NO ACTIVE PROBLEMS       Past Surgical History:   Procedure Laterality Date     NO HISTORY OF SURGERY       OB History   No obstetric history on file.       Review of Systems   Constitutional: Negative for chills and fever.   HENT: Negative for congestion, ear pain, hearing loss and sore throat.    Eyes: Negative for pain and visual disturbance.   Respiratory: Negative for cough and shortness of breath.    Cardiovascular: Negative for chest pain, palpitations and peripheral edema.   Gastrointestinal: Negative for abdominal pain, constipation, diarrhea, heartburn, hematochezia and nausea.   Genitourinary: Negative for dysuria, frequency, genital sores, hematuria, impotence, penile discharge and urgency.   Musculoskeletal: Negative for arthralgias, joint swelling and myalgias.   Skin: Negative for rash.   Neurological: Negative for dizziness, weakness, headaches and paresthesias.   Psychiatric/Behavioral: Negative for mood changes. The patient is not nervous/anxious.      CONSTITUTIONAL: NEGATIVE for fever, chills, change in weight  INTEGUMENTARY/SKIN: NEGATIVE for  "worrisome rashes, moles or lesions  EYES: NEGATIVE for vision changes or irritation  ENT: NEGATIVE for ear, mouth and throat problems  RESP: NEGATIVE for significant cough or SOB  CV: NEGATIVE for chest pain, palpitations or peripheral edema  GI: NEGATIVE for nausea, abdominal pain, heartburn, or change in bowel habits   male: negative for dysuria, hematuria, decreased urinary stream, erectile dysfunction, urethral discharge  MUSCULOSKELETAL: NEGATIVE for significant arthralgias or myalgia  NEURO: NEGATIVE for weakness, dizziness or paresthesias  PSYCHIATRIC: NEGATIVE for changes in mood or affect    OBJECTIVE:   /74   Pulse 72   Temp 98  F (36.7  C) (Oral)   Resp 16   Ht 1.88 m (6' 2\")   Wt 122 kg (269 lb)   SpO2 100%   BMI 34.54 kg/m      Physical Exam  GENERAL: healthy, alert and no distress  EYES: Eyes grossly normal to inspection, PERRL and conjunctivae and sclerae normal  HENT: ear canals and TM's normal, nose and mouth without ulcers or lesions  NECK: no adenopathy, no asymmetry, masses, or scars and thyroid normal to palpation  RESP: lungs clear to auscultation - no rales, rhonchi or wheezes  CV: regular rate and rhythm, normal S1 S2, no S3 or S4, no murmur, click or rub, no peripheral edema and peripheral pulses strong  ABDOMEN: soft, nontender, no hepatosplenomegaly, no masses and bowel sounds normal  MS: no gross musculoskeletal defects noted, no edema  SKIN: no suspicious lesions or rashes  NEURO: Normal strength and tone, mentation intact and speech normal  PSYCH: mentation appears normal, affect normal/bright    Diagnostic Test Results:  Labs reviewed in Epic    ASSESSMENT/PLAN:       ICD-10-CM    1. Routine general medical examination at a health care facility  Z00.00    2. Elevated triglycerides with high cholesterol  E78.2 TSH with free T4 reflex     Hepatic panel (Albumin, ALT, AST, Bili, Alk Phos, TP)     TSH with free T4 reflex     Hepatic panel (Albumin, ALT, AST, Bili, Alk Phos, " "TP)   3. Elevated blood sugar  R73.9 Hemoglobin A1c     Hemoglobin A1c   4. Screen for colon cancer  Z12.11    5. Screening for HIV (human immunodeficiency virus)  Z11.4 HIV Antigen Antibody Combo     HIV Antigen Antibody Combo   6. Need for hepatitis C screening test  Z11.59 Hepatitis C Screen Reflex to HCV RNA Quant and Genotype     Hepatitis C Screen Reflex to HCV RNA Quant and Genotype   7. Severe obstructive sleep apnea  G47.33    8. Immunity status testing  Z01.84 Hepatitis B Surface Antibody     Hepatitis B surface antigen     Hepatitis Antibody A IgG     Hepatitis B Surface Antibody     Hepatitis B surface antigen     Hepatitis Antibody A IgG   9. Screening for thyroid disorder  Z13.29 TSH with free T4 reflex     TSH with free T4 reflex     Newly established patient   hihg triglycerides-lifestyle changes, Advised fish oil supplements    Elevated blood sugar- diabetes mellitus test is normal,    BMI 34.5/obesity-Work on dietary changes and exercise    Colonoscopy as planned    demi-did not follow up with sleep clinic, reviewed diet and exercises , importance of treating demi-Repeat sleep study in one year    Follow up in a year    If hep b/a immunity is neg please go to pharmacy to get vaccine uptodate  Patient has been advised of split billing requirements and indicates understanding: Yes    COUNSELING:   Reviewed preventive health counseling, as reflected in patient instructions       Healthy diet/nutrition    Estimated body mass index is 34.54 kg/m  as calculated from the following:    Height as of this encounter: 1.88 m (6' 2\").    Weight as of this encounter: 122 kg (269 lb).     Weight management plan: Discussed healthy diet and exercise guidelines    He reports that he quit smoking about 8 years ago. He has never used smokeless tobacco.      Counseling Resources:  ATP IV Guidelines  Pooled Cohorts Equation Calculator  FRAX Risk Assessment  ICSI Preventive Guidelines  Dietary Guidelines for Americans, " 2010  USDA's MyPlate  ASA Prophylaxis  Lung CA Screening    Cesar Killian DO  [facility]

## 2022-07-11 NOTE — PATIENT INSTRUCTIONS
Advised fish oil supplements    Your diabetes mellitus test is normal,  Work on dietary changes and exercise  Colonoscopy as planned  Repeat sleep study in one year  Follow up in a year    If hep b/a immunity is neg please go to pharmacy to get vaccine uptodate    Nice to see you  Cesar Killian D.O.        Patient Education       Preventive Health Recommendations  Male Ages 40 to 49    Yearly exam:             See your health care provider every year in order to  o   Review health changes.   o   Discuss preventive care.    o   Review your medicines if your doctor has prescribed any.  You should be tested each year for STDs (sexually transmitted diseases) if you re at risk.   Have a cholesterol test every 5 years.   Have a colonoscopy (test for colon cancer) if someone in your family has had colon cancer or polyps before age 50.   After age 45, have a diabetes test (fasting glucose). If you are at risk for diabetes, you should have this test every 3 years.    Talk with your health care provider about whether or not a prostate cancer screening test (PSA) is right for you.    Shots: Get a flu shot each year. Get a tetanus shot every 10 years.     Nutrition:  Eat at least 5 servings of fruits and vegetables daily.   Eat whole-grain bread, whole-wheat pasta and brown rice instead of white grains and rice.   Get adequate Calcium and Vitamin D.     Lifestyle  Exercise for at least 150 minutes a week (30 minutes a day, 5 days a week). This will help you control your weight and prevent disease.   Limit alcohol to one drink per day.   No smoking.   Wear sunscreen to prevent skin cancer.   See your dentist every six months for an exam and cleaning.

## 2022-07-12 LAB
HAV IGG SER QL IA: NONREACTIVE
HBV SURFACE AB SERPL IA-ACNC: 1.13 M[IU]/ML
HBV SURFACE AG SERPL QL IA: NONREACTIVE
HCV AB SERPL QL IA: NONREACTIVE
HIV 1+2 AB+HIV1 P24 AG SERPL QL IA: NONREACTIVE

## 2022-07-13 LAB
ALBUMIN SERPL-MCNC: 3.8 G/DL (ref 3.4–5)
ALP SERPL-CCNC: 60 U/L (ref 40–150)
ALT SERPL W P-5'-P-CCNC: 24 U/L (ref 0–70)
AST SERPL W P-5'-P-CCNC: 18 U/L (ref 0–45)
BILIRUB DIRECT SERPL-MCNC: 0.1 MG/DL (ref 0–0.2)
BILIRUB SERPL-MCNC: 0.8 MG/DL (ref 0.2–1.3)
PROT SERPL-MCNC: 6.8 G/DL (ref 6.8–8.8)
TSH SERPL DL<=0.005 MIU/L-ACNC: 0.73 MU/L (ref 0.4–4)

## 2022-07-20 NOTE — RESULT ENCOUNTER NOTE
All your results are essentially tara. Please contact me if you have any questions.  Take care,  Cesar Killian D.O.

## 2022-08-24 ENCOUNTER — VIRTUAL VISIT (OUTPATIENT)
Dept: FAMILY MEDICINE | Facility: CLINIC | Age: 47
End: 2022-08-24
Payer: COMMERCIAL

## 2022-08-24 DIAGNOSIS — U07.1 INFECTION DUE TO 2019 NOVEL CORONAVIRUS: Primary | ICD-10-CM

## 2022-08-24 PROCEDURE — 99214 OFFICE O/P EST MOD 30 MIN: CPT | Mod: 95 | Performed by: FAMILY MEDICINE

## 2022-08-24 RX ORDER — IBUPROFEN 200 MG
1 TABLET ORAL
COMMUNITY

## 2022-08-24 NOTE — PROGRESS NOTES
Jose is a 47 year old who is being evaluated via a billable video visit.      How would you like to obtain your AVS? [employer]  If the video visit is dropped, the invitation should be resent by: Text to cell phone: [phone]  Will anyone else be joining your video visit? No          Assessment & Plan     Infection due to 2019 novel coronavirus  Symptoms began a little over a day ago with just some generalized cough.  Now having worsening today with a lot of fatigue and fever.  No shortness of breath.  Took a home test this morning and it was positive.  Interested in talking about treatment given his issues with weight and sleep apnea.  We discussed risks and benefits of therapy and he is interested in trying Paxlovid.  I will send this in and have him follow-up in a few days if things are worsening.  Warning signs discussed.  - nirmatrelvir and ritonavir (PAXLOVID) therapy pack; Take 3 tablets by mouth 2 times daily for 5 days (Take 2 Nirmatrelvir tablets and 1 Ritonavir tablet twice daily for 5 days)    My first visit with patient previous history reviewed with him and in his chart     See Patient Instructions    Return in about 3 days (around 8/27/2022) for If not improving as expected, [employer] message.    Stephanie Hernandez MD  [facility]    Byron Garcia is a 47 year old, presenting for the following health issues:  No chief complaint on file.      HPI     Pt is hoping to discuss treatment     COVID-19 Symptom Review  How many days ago did these symptoms start? 8/23/22    Are any of the following symptoms significant for you?    New or worsening difficulty breathing? No    Worsening cough? Yes, I am coughing up mucus.    Fever or chills? Yes, I felt feverish or had chills.    Headache: YES    Sore throat: No    Chest pain: Tightness in chest     Diarrhea: No    Body aches? No    What treatments has patient tried? Acetaminophen   Does patient live in a nursing home, group home, or  shelter? No  Does patient have a way to get food/medications during quarantined? Yes, I have a friend or family member who can help me.              Video visit patient today to talk about COVID treatments.    Review of Systems   Constitutional, HEENT, cardiovascular, pulmonary, gi and gu systems are negative, except as otherwise noted.      Objective           Vitals:  No vitals were obtained today due to virtual visit.    Physical Exam   GENERAL: Healthy, alert and no distress  EYES: Eyes grossly normal to inspection.  No discharge or erythema, or obvious scleral/conjunctival abnormalities.  RESP: No audible wheeze, cough, or visible cyanosis.  No visible retractions or increased work of breathing.    SKIN: Visible skin clear. No significant rash, abnormal pigmentation or lesions.  NEURO: Cranial nerves grossly intact.  Mentation and speech appropriate for age.  PSYCH: Mentation appears normal, affect normal/bright, judgement and insight intact, normal speech and appearance well-groomed.    Past labs reviewed with the patient.             Video-Visit Details    Video Start Time: 1630    Type of service:  Video Visit    Video End Time:1635    Originating Location (pt. Location): Home    Distant Location (provider location):  [facility]     Platform used for Video Visit: "[employer]  ..